# Patient Record
Sex: FEMALE | Race: WHITE | NOT HISPANIC OR LATINO | Employment: OTHER | URBAN - METROPOLITAN AREA
[De-identification: names, ages, dates, MRNs, and addresses within clinical notes are randomized per-mention and may not be internally consistent; named-entity substitution may affect disease eponyms.]

---

## 2018-04-09 LAB
ALBUMIN SERPL BCP-MCNC: 4.2 G/DL (ref 3.5–5.7)
ALP SERPL-CCNC: 62 IU/L (ref 55–165)
ALT SERPL W P-5'-P-CCNC: 12 IU/L (ref 9–28)
ANION GAP SERPL CALCULATED.3IONS-SCNC: 10.4 MM/L
ANISOCYTOSIS (HISTORICAL): ABNORMAL
AST SERPL W P-5'-P-CCNC: 24 U/L (ref 7–26)
BANDS (HISTORICAL): 4
BASOPHILS # BLD AUTO: 0.1 X3/UL (ref 0–0.3)
BASOPHILS # BLD AUTO: 1.1 % (ref 0–2)
BILIRUB SERPL-MCNC: 0.9 MG/DL (ref 0.3–1)
BUN SERPL-MCNC: 12 MG/DL (ref 7–25)
CALCIUM SERPL-MCNC: 9.3 MG/DL (ref 8.6–10.5)
CHLORIDE SERPL-SCNC: 104 MM/L (ref 98–107)
CO2 SERPL-SCNC: 29 MM/L (ref 21–31)
CREAT SERPL-MCNC: 0.66 MG/DL (ref 0.6–1.2)
DEPRECATED RDW RBC AUTO: 20.4 % (ref 11.5–14.5)
EGFR (HISTORICAL): > 60 GFR
EGFR AFRICAN AMERICAN (HISTORICAL): > 60 GFR
EOSINOPHIL # BLD AUTO: 0.1 X3/UL (ref 0–0.5)
EOSINOPHIL NFR BLD AUTO: 0.4 % (ref 0–5)
GLUCOSE (HISTORICAL): 139 MG/DL (ref 65–99)
HCT VFR BLD AUTO: 33.7 % (ref 37–47)
HGB BLD-MCNC: 10.4 G/DL (ref 12–16)
HYPOCHROMASIA (HISTORICAL): SLIGHT
LYMPHOCYTES # BLD AUTO: 1.1 X3/UL (ref 1.2–4.2)
LYMPHOCYTES NFR BLD AUTO: 8 % (ref 20.5–51.1)
LYMPHOCYTES NFR BLD AUTO: 8.2 % (ref 20.5–51.1)
MCH RBC QN AUTO: 21.7 PG (ref 26–34)
MCHC RBC AUTO-ENTMCNC: 31 G/DL (ref 31–36)
MCV RBC AUTO: 70.2 FL (ref 81–99)
MICROCYTOSIS (HISTORICAL): ABNORMAL
MONOCYTES # BLD AUTO: 0.7 X3/UL (ref 0–1)
MONOCYTES (HISTORICAL): 3 % (ref 1.7–12)
MONOCYTES NFR BLD AUTO: 4.8 % (ref 1.7–12)
NEUTROPHILS # BLD AUTO: 11.8 X3/UL (ref 1.4–6.5)
NEUTROPHILS ABS COUNT (HISTORICAL): 85 % (ref 42.2–75.2)
NEUTS SEG NFR BLD AUTO: 85.5 % (ref 42.2–75.2)
NRBC'S (HISTORICAL): 3 /100W
OSMOLALITY, SERUM (HISTORICAL): 281 MOSM (ref 262–291)
OVALOCYTOSIS (HISTORICAL): SLIGHT
PLATELET # BLD AUTO: 88 X3/UL (ref 130–400)
PLATELET ESTIMATE (HISTORICAL): ABNORMAL
PMV BLD AUTO: 9.4 FL (ref 8.6–11.7)
POTASSIUM SERPL-SCNC: 3.4 MM/L (ref 3.5–5.5)
RBC # BLD AUTO: 4.81 X6/UL (ref 3.9–5.2)
SODIUM SERPL-SCNC: 140 MM/L (ref 134–143)
STOMATOCYTOSIS (HISTORICAL): SLIGHT
TEAR DROP CELLS (HISTORICAL): SLIGHT
TOTAL PROTEIN (HISTORICAL): 6.6 G/DL (ref 6.4–8.9)
WBC # BLD AUTO: 13.9 X3/UL (ref 4.8–10.8)

## 2018-04-10 LAB
EST. AVERAGE GLUCOSE BLD GHB EST-MCNC: 146 MG/DL
HBA1C MFR BLD HPLC: 6.7 % (ref 4–6.2)

## 2018-05-07 ENCOUNTER — EVALUATION (OUTPATIENT)
Dept: PHYSICAL THERAPY | Facility: CLINIC | Age: 73
End: 2018-05-07
Payer: MEDICARE

## 2018-05-07 DIAGNOSIS — M46.1 SACROILIITIS, NOT ELSEWHERE CLASSIFIED (HCC): Primary | ICD-10-CM

## 2018-05-07 DIAGNOSIS — M54.31 RIGHT SIDED SCIATICA: ICD-10-CM

## 2018-05-07 PROCEDURE — 97162 PT EVAL MOD COMPLEX 30 MIN: CPT | Performed by: PHYSICAL THERAPIST

## 2018-05-07 PROCEDURE — G8991 OTHER PT/OT GOAL STATUS: HCPCS | Performed by: PHYSICAL THERAPIST

## 2018-05-07 PROCEDURE — G8990 OTHER PT/OT CURRENT STATUS: HCPCS | Performed by: PHYSICAL THERAPIST

## 2018-05-07 NOTE — LETTER
May 8, 2018    [facility] [address]    Patient: Ashok Delgado   YOB: 1945   Date of Visit: 2018     Encounter Diagnosis     ICD-10-CM    1  Sacroiliitis, not elsewhere classified ([facility] ) M46 1    2  Right sided sciatica M54 31        Dear Dr Basilio Generous:    Please review the attached Plan of Care from [facility] recent visit  Please verify that you agree therapy should continue by signing the attached document and sending it back to our office  If you have any questions or concerns, please don't hesitate to call  Sincerely,    Liam Sheehan PT      Referring Provider:      I certify that I have read the below Plan of Care and certify the need for these services furnished under this plan of treatment while under my care  Beto Sumner MD  [address] [id number]  VIA Facsimile: [phone]          PT Evaluation     Today's date: 2018  Patient name: Ashok Delgado  : 1945  MRN: [id number]  Referring provider: Jarret Peterson MD  Dx:   Encounter Diagnosis     ICD-10-CM    1  Sacroiliitis, not elsewhere classified ([facility] 75 ) M46 1    2  Right sided sciatica M54 31                   Assessment  Impairments: abnormal or restricted ROM, activity intolerance and pain with function  Functional limitations: sitting tolerance limited to 30 mins, standing tolerance limited to 30 mins, walking tolerance limited to <100 feet  Assessment details: Ashok Delgado is a 74 y o  female who presents with complaints of R sided low back and buttock pain with radiating pain into R LE  MD dx of Sacroiliitis, not elsewhere classified ([facility] 75 )  (primary encounter diagnosis) and Right sided sciatica  No further referral appears necessary at this time based upon examination results  Prognosis is good given HEP compliance and PT 2x/wk  Please contact me if you have any questions or recommendations    Thank you for the opportunity to share in  [place]'s care  Understanding of Dx/Px/POC: good   Prognosis: good    Goals  STGs  1  In 4 weeks, patient will demonstrate a decrease in pain to 2/10 during functional activities  2  In 4 weeks, patient will demonstrate an increase in pain free range of motion by 25% lumbar ROM lateral flexion and rotation  3  In 4 weeks, patient will demonstrate independence with HEP    LTGs  1  In 4-8 weeks, patient will be independent with ambulation for >500 feet, no AD  2  In 4-8 weeks, patient will be able to tolerate sitting for >1 hr without increased pain  3  In 4-8 weeks, patient will demonstrate lumbar spine ROM WNL all directions pain free      Plan  Patient would benefit from: skilled PT  Planned modality interventions: thermotherapy: hydrocollator packs and cryotherapy  Planned therapy interventions: joint mobilization, manual therapy, activity modification, abdominal trunk stabilization, balance, postural training, patient education, stretching, strengthening, therapeutic exercise, home exercise program and body mechanics training  Frequency: 2x week  Duration in weeks: 4  Treatment plan discussed with: patient        Subjective Evaluation    History of Present Illness  Date of onset: 2018  Mechanism of injury: Patient reports she "almost fell" at the of February at home when her knee gave out on her  Her right ankle "turned in" and she was able to grab onto freezer next to her to avoid a fall to the ground     Not a recurrent problem   Pain  Current pain rating: 3  At best pain ratin  At worst pain ratin  Location: begins in center of lower back, radiates into R side/ R buttock radiates along lateral/posterior aspect of thigh and lower leg into lateral foot  Quality: burning and dull ache (burning in hip/buttock; ache in the heel)  Relieving factors: rest, heat and medications (takes Acetopminiphen prn, has relief within 30-45 mins)  Aggravating factors: walking and sitting  Progression: improved (noticed improvement after completing sterroid medication pack within last 2 weeks)    Social Support  Steps to enter house: yes  Stairs in house: yes   Lives in: multiple-level home  Lives with: spouse    Employment status: not working  Exercise history: cleaning, yardwork, planting/gardening      Diagnostic Tests  X-ray: normal  Treatments  Current treatment: medication  Patient Goals  Patient goals for therapy: decreased pain  Patient goal: to be able to walk without the cane and to lose the fear of falling        Objective     Special Questions  Positive for history of cancer   Negative for night pain, bladder dysfunction, bowel dysfunction and saddle (S4) numbness    Neurological Testing     Sensation     Lumbar   Left   Intact: light touch    Right   Diminished: light touch    Comments   Right light touch: diminished along L2,L3 and S1 dermatomes    Reflexes   Left   Patellar (L4): brisk (3+)  Achilles (S1): normal (2+)  Babinski sign: negative  Clonus sign: negative    Right   Patellar (L4): brisk (3+)  Achilles (S1): normal (2+)  Babinski sign: negative  Clonus sign: negative    Active Range of Motion     Additional Active Range of Motion Details  Flexion in standing = 25% limited, mild relief from pain  Repeated flexion in standing = reduces pain, improved flexibility with reps  Extension in standing = produces pain in center of lumbar spine  Repeated extension in standing = produces consistent pain at center of lumbar spine    Flexion in sitting = does not affect pain  Repeated flexion in sitting = does not affect pain    R lateral flexion in standing = 50-75% limited ROM, reproduces pain on R side lumbar spine  L lateral flexion in standing = 50-75% limited ROM, reproduces a painful pull on R side lumbar spine  R rotation in standing = 75% limited ROM, produces pain on R side lumbar spine  L rotation in standing = 75% limited ROM, produces mild pain on L side lumbar spine    Flexion in lying = increases R side lumbar spine pain  Repeated flexion in lying = increases R side lumbar spine pain  Extension in lying = NT (patient states she could not lay prone ever since having children, pain is chronic)  Repeated extension in lying = NT    Rotation in lying:  Knees to R = "stiff"  Knees to L = "pulling pain"    Strength/Myotome Testing     Lumbar   Left   Normal strength    Right   Normal strength    Tests       Thoracic   Negative slump  Lumbar   Negative slump  Left   Positive crossed SLR and passive SLR  Right   Positive passive SLR       Additional Tests Details  Passive SLR pain begins at <50* of hip flexion  HS 90-90: -30* b/l              Daily Treatment Diary   Precautions: polycythemia vera (rare blood CA), osteoporosis  Manual              Piriformis stretch b/l                                                                     Exercise Diary              NuStep             Piriformis stretch             HS stretch             Gastroc stretch             Sciatic nerve glides - R only (supine knee ext with ankle PF)             Posterior pelvic tilting             Standing lumbar extension             LTR with knees to R                                                                                                                                                                             Modalities              CP to lumbar spine/R buttock post tx

## 2018-05-07 NOTE — PROGRESS NOTES
PT Evaluation     Today's date: 2018  Patient name: Farooq Mckay  : 1945  MRN: [id number]  Referring provider: Quan Weinberg MD  Dx:   Encounter Diagnosis     ICD-10-CM    1  Sacroiliitis, not elsewhere classified ([facility] 75 ) M46 1    2  Right sided sciatica M54 31                   Assessment  Impairments: abnormal or restricted ROM, activity intolerance and pain with function  Functional limitations: sitting tolerance limited to 30 mins, standing tolerance limited to 30 mins, walking tolerance limited to <100 feet  Assessment details: Farooq Mckay is a 74 y o  female who presents with complaints of R sided low back and buttock pain with radiating pain into R LE  MD dx of Sacroiliitis, not elsewhere classified ([facility] 75 )  (primary encounter diagnosis) and Right sided sciatica  No further referral appears necessary at this time based upon examination results  Prognosis is good given HEP compliance and PT 2x/wk  Please contact me if you have any questions or recommendations  Thank you for the opportunity to share in  [place]'s care  Understanding of Dx/Px/POC: good   Prognosis: good    Goals  STGs  1  In 4 weeks, patient will demonstrate a decrease in pain to 2/10 during functional activities  2  In 4 weeks, patient will demonstrate an increase in pain free range of motion by 25% lumbar ROM lateral flexion and rotation  3  In 4 weeks, patient will demonstrate independence with HEP    LTGs  1  In 4-8 weeks, patient will be independent with ambulation for >500 feet, no AD  2  In 4-8 weeks, patient will be able to tolerate sitting for >1 hr without increased pain    3  In 4-8 weeks, patient will demonstrate lumbar spine ROM WNL all directions pain free      Plan  Patient would benefit from: skilled PT  Planned modality interventions: thermotherapy: hydrocollator packs and cryotherapy  Planned therapy interventions: joint mobilization, manual therapy, activity modification, abdominal trunk stabilization, balance, postural training, patient education, stretching, strengthening, therapeutic exercise, home exercise program and body mechanics training  Frequency: 2x week  Duration in weeks: 4  Treatment plan discussed with: patient        Subjective Evaluation    History of Present Illness  Date of onset: 2018  Mechanism of injury: Patient reports she "almost fell" at the of February at home when her knee gave out on her  Her right ankle "turned in" and she was able to grab onto freezer next to her to avoid a fall to the ground  Not a recurrent problem   Pain  Current pain rating: 3  At best pain ratin  At worst pain ratin  Location: begins in center of lower back, radiates into R side/ R buttock radiates along lateral/posterior aspect of thigh and lower leg into lateral foot  Quality: burning and dull ache (burning in hip/buttock; ache in the heel)  Relieving factors: rest, heat and medications (takes Acetopminiphen prn, has relief within 30-45 mins)  Aggravating factors: walking and sitting  Progression: improved (noticed improvement after completing sterroid medication pack within last 2 weeks)    Social Support  Steps to enter house: yes  Stairs in house: yes   Lives in: multiple-level home  Lives with: spouse    Employment status: not working  Exercise history: cleaning, yardwork, planting/gardening      Diagnostic Tests  X-ray: normal  Treatments  Current treatment: medication  Patient Goals  Patient goals for therapy: decreased pain  Patient goal: to be able to walk without the cane and to lose the fear of falling        Objective     Special Questions  Positive for history of cancer   Negative for night pain, bladder dysfunction, bowel dysfunction and saddle (S4) numbness    Neurological Testing     Sensation     Lumbar   Left   Intact: light touch    Right   Diminished: light touch    Comments   Right light touch: diminished along L2,L3 and S1 dermatomes    Reflexes   Left   Patellar (L4): brisk (3+)  Achilles (S1): normal (2+)  Babinski sign: negative  Clonus sign: negative    Right   Patellar (L4): brisk (3+)  Achilles (S1): normal (2+)  Babinski sign: negative  Clonus sign: negative    Active Range of Motion     Additional Active Range of Motion Details  Flexion in standing = 25% limited, mild relief from pain  Repeated flexion in standing = reduces pain, improved flexibility with reps  Extension in standing = produces pain in center of lumbar spine  Repeated extension in standing = produces consistent pain at center of lumbar spine    Flexion in sitting = does not affect pain  Repeated flexion in sitting = does not affect pain    R lateral flexion in standing = 50-75% limited ROM, reproduces pain on R side lumbar spine  L lateral flexion in standing = 50-75% limited ROM, reproduces a painful pull on R side lumbar spine  R rotation in standing = 75% limited ROM, produces pain on R side lumbar spine  L rotation in standing = 75% limited ROM, produces mild pain on L side lumbar spine    Flexion in lying = increases R side lumbar spine pain  Repeated flexion in lying = increases R side lumbar spine pain  Extension in lying = NT (patient states she could not lay prone ever since having children, pain is chronic)  Repeated extension in lying = NT    Rotation in lying:  Knees to R = "stiff"  Knees to L = "pulling pain"    Strength/Myotome Testing     Lumbar   Left   Normal strength    Right   Normal strength    Tests       Thoracic   Negative slump  Lumbar   Negative slump  Left   Positive crossed SLR and passive SLR  Right   Positive passive SLR       Additional Tests Details  Passive SLR pain begins at <50* of hip flexion  HS 90-90: -30* b/l              Daily Treatment Diary   Precautions: polycythemia vera (rare blood CA), osteoporosis  Manual              Piriformis stretch b/l                                                                     Exercise Diary              NuStep Piriformis stretch             HS stretch             Gastroc stretch             Sciatic nerve glides - R only (supine knee ext with ankle PF)             Posterior pelvic tilting             Standing lumbar extension             LTR with knees to R                                                                                                                                                                             Modalities              CP to lumbar spine/R buttock post tx

## 2018-05-08 ENCOUNTER — TRANSCRIBE ORDERS (OUTPATIENT)
Dept: PHYSICAL THERAPY | Facility: CLINIC | Age: 73
End: 2018-05-08

## 2018-05-08 DIAGNOSIS — M46.1 SACROILIITIS, NOT ELSEWHERE CLASSIFIED (HCC): Primary | ICD-10-CM

## 2018-05-08 DIAGNOSIS — M54.31 BILATERAL SCIATICA: ICD-10-CM

## 2018-05-08 DIAGNOSIS — M54.32 BILATERAL SCIATICA: ICD-10-CM

## 2018-05-11 ENCOUNTER — OFFICE VISIT (OUTPATIENT)
Dept: PHYSICAL THERAPY | Facility: CLINIC | Age: 73
End: 2018-05-11
Payer: MEDICARE

## 2018-05-11 DIAGNOSIS — M54.31 RIGHT SIDED SCIATICA: ICD-10-CM

## 2018-05-11 DIAGNOSIS — M46.1 SACROILIITIS, NOT ELSEWHERE CLASSIFIED (HCC): Primary | ICD-10-CM

## 2018-05-11 PROCEDURE — 97140 MANUAL THERAPY 1/> REGIONS: CPT | Performed by: PHYSICAL THERAPIST

## 2018-05-11 PROCEDURE — 97110 THERAPEUTIC EXERCISES: CPT | Performed by: PHYSICAL THERAPIST

## 2018-05-11 NOTE — PROGRESS NOTES
Daily Note     Today's date: 2018  Patient name: Mandi Bah  : 1945  MRN: [id number]  Referring provider: Curtis Joya MD  Dx:   Encounter Diagnosis     ICD-10-CM    1  Sacroiliitis, not elsewhere classified ([facility] ) M46 1    2  Right sided sciatica M54 31                   Subjective: "I did some gardening yesterday       Objective: See treatment diary below  Daily Treatment Diary   Precautions: polycythemia vera (rare blood CA), osteoporosis  Manual              Piriformis stretch b/l 15'                                                                    Exercise Diary              NuStep L1, 10'            Piriformis stretch 10"x10 ea            HS stretch 90-90 with towel, 10"x10 ea            Gastroc stretch w/strap, 10"x10            Sciatic nerve glides - R only (supine knee ext with ankle PF)             Posterior pelvic tilting             Standing lumbar extension             LTR with knees to R                                                                                                                                                                             Modalities              CP to lumbar spine/R buttock post tx                                           Assessment: Tolerated treatment well  Patient slow to move through exercises  Patient exhibited good technique with therapeutic exercises and would benefit from continued PT Provided with HEP to begin piriformis and HS stretching at home  Plan: Continue per plan of care  Progress treatment as tolerated

## 2018-05-15 ENCOUNTER — OFFICE VISIT (OUTPATIENT)
Dept: PHYSICAL THERAPY | Facility: CLINIC | Age: 73
End: 2018-05-15
Payer: MEDICARE

## 2018-05-15 DIAGNOSIS — M46.1 SACROILIITIS, NOT ELSEWHERE CLASSIFIED (HCC): Primary | ICD-10-CM

## 2018-05-15 DIAGNOSIS — M54.31 RIGHT SIDED SCIATICA: ICD-10-CM

## 2018-05-15 PROCEDURE — 97110 THERAPEUTIC EXERCISES: CPT | Performed by: PHYSICAL THERAPIST

## 2018-05-15 PROCEDURE — 97140 MANUAL THERAPY 1/> REGIONS: CPT | Performed by: PHYSICAL THERAPIST

## 2018-05-15 NOTE — PROGRESS NOTES
Daily Note     Today's date: 5/15/2018  Patient name: Lizbet Chacon  : 1945  MRN: [id number]  Referring provider: Vanessa Craig MD  Dx:   Encounter Diagnosis     ICD-10-CM    1  Sacroiliitis, not elsewhere classified ([facility] ) M46 1    2  Right sided sciatica M54 31                   Subjective: Patient states she still has pain down her right leg  "It was all the way into my foot this morning but I took a Tylenol and now it's only into my R buttock " Patient states she has been doing some of her home exercises but not all of them  Objective: See treatment diary below  Daily Treatment Diary   Precautions: polycythemia vera (rare blood CA), osteoporosis  Manual  5/11 5/15           Piriformis stretch b/l 15' 15'                                                                   Exercise Diary  5/11 5/15           NuStep L1, 10' L3, 10'           Piriformis stretch 10"x10 ea            HS stretch 90-90 with towel, 10"x10 ea            Gastroc stretch w/strap, 10"x10            Sciatic nerve glides - R only (supine knee ext with ankle PF)  10x           Posterior pelvic tilting  5"x10           Standing lumbar extension  against // bar for balance/safety; 3"x10           LTR with knees to R  10"x10           Bridges   modified ROM, 10x                                                                   Modalities  5/11 5/15           CP to lumbar spine/R buttock post tx deferred deferred                                           Assessment: Tolerated treatment well  Patient reported reduced R LE pain at end of treatment  Patient exhibited good technique with therapeutic exercises and would benefit from continued PT      Plan: Continue per plan of care  Progress treatment as tolerated

## 2018-05-17 ENCOUNTER — OFFICE VISIT (OUTPATIENT)
Dept: PHYSICAL THERAPY | Facility: CLINIC | Age: 73
End: 2018-05-17
Payer: MEDICARE

## 2018-05-17 DIAGNOSIS — M46.1 SACROILIITIS, NOT ELSEWHERE CLASSIFIED (HCC): Primary | ICD-10-CM

## 2018-05-17 DIAGNOSIS — M54.31 RIGHT SIDED SCIATICA: ICD-10-CM

## 2018-05-17 PROCEDURE — 97110 THERAPEUTIC EXERCISES: CPT | Performed by: PHYSICAL THERAPIST

## 2018-05-17 PROCEDURE — 97140 MANUAL THERAPY 1/> REGIONS: CPT | Performed by: PHYSICAL THERAPIST

## 2018-05-17 NOTE — PROGRESS NOTES
Daily Note     Today's date: 2018  Patient name: Mihai Singer  : 1945  MRN: [id number]  Referring provider: Taryn Hussein MD  Dx:   Encounter Diagnosis     ICD-10-CM    1  [facility]ac[facility]ili[facility], not elsewhere [facility] ) M46 1    2  Right sided sciatica M54 31                   Subjective: "I'm doing really good  I got so much done yesterday, helped move some furniture, cleaned my windows and lamps  It's amazing what I can do compared to just a few weeks ago "      Objective: See treatment diary below  Daily Treatment Diary   Precautions: polycythemia vera (rare blood CA), osteoporosis  Manual  5/11 5/15 5/17          Piriformis stretch b/l 15' 15' 15'                                                                  Exercise Diary  5/11 5/15 5/17          NuStep L1, 10' L3, 10' L3, 10'          Piriformis stretch 10"x10 ea            HS stretch 90-90 with towel, 10"x10 ea            Gastroc stretch w/strap, 10"x10  standing, against edge of step, 10"x10 ea          Sciatic nerve glides - R only (supine knee ext with ankle PF)  10x 10x          Posterior pelvic tilting  5"x10 5"x20          Standing lumbar extension  against // bar for balance/safety; 3"x10 against // bar for balance/safety; 3"x10          LTR with knees to R  10"x10 10"x10          Bridges   modified ROM, 10x Mod ROM, 10x                                                                  Modalities  5/11 5/15 5/17          CP to lumbar spine/R buttock post tx deferred deferred deferred                                          Assessment: Tolerated treatment well  Progressed reps with pelvic tilts and added standing gastroc stretching  Patient would benefit from continued PT      Plan: Continue per plan of care  Progress treatment as tolerated

## 2018-05-22 ENCOUNTER — OFFICE VISIT (OUTPATIENT)
Dept: PHYSICAL THERAPY | Facility: CLINIC | Age: 73
End: 2018-05-22
Payer: MEDICARE

## 2018-05-22 DIAGNOSIS — M54.31 RIGHT SIDED SCIATICA: ICD-10-CM

## 2018-05-22 DIAGNOSIS — M46.1 SACROILIITIS, NOT ELSEWHERE CLASSIFIED (HCC): Primary | ICD-10-CM

## 2018-05-22 PROCEDURE — 97140 MANUAL THERAPY 1/> REGIONS: CPT | Performed by: PHYSICAL THERAPIST

## 2018-05-22 PROCEDURE — 97110 THERAPEUTIC EXERCISES: CPT | Performed by: PHYSICAL THERAPIST

## 2018-05-22 NOTE — PROGRESS NOTES
Daily Note     Today's date: 2018  Patient name: Mamadou De La Cruz  : 1945  MRN: [id number]  Referring provider: Marija Carlson MD  Dx:   Encounter Diagnosis     ICD-10-CM    1  Sacroili[facility], not elsewhere classified ([facility] ) M46 1    2  Right sided sciatica M54 31                   Subjective: Patient reports she has been very active around the home and has not needed her cane today  "I only carry it because I'm afraid "      Objective: See treatment diary below  Daily Treatment Diary   Precautions: polycythemia vera (rare blood CA), osteoporosis  Manual  5/11 5/15 5/17 5/22    Piriformis stretch b/l 15' 15' 15' 15'                                        Exercise Diary  5/11 5/15 5/17 5/22    NuStep L1, 10' L3, 10' L3, 10' L3,10'    Piriformis stretch 10"x10 ea       HS stretch 90-90 with towel, 10"x10 ea       Gastroc stretch w/strap, 10"x10  standing, against edge of step, 10"x10 ea     Sciatic nerve glides - R only (supine knee ext with ankle PF)  10x 10x 10x    Posterior pelvic tilting  5"x10 5"x20 5"x20    Standing lumbar extension  against // bar for balance/safety; 3"x10 against // bar for balance/safety; 3"x10     LTR  10"x10 knees to R 10"x10 knees to R 10"x10 b/l    Bridges   modified ROM, 10x Mod ROM, 10x Mod ROM, 15x    SLR flexion    10x ea    Step ups    "A" step 10x ea    Mini squats    10x                Modalities  5/11 5/15 5/17 5/22    CP to lumbar spine/R buttock post tx deferred deferred deferred                             Assessment: Tolerated treatment well  Glut max weakness persists as patient is unable to perform full ROM bridge exercise  Added mini squats and step ups today for standing glut and quad activation  Patient demonstrated fatigue post treatment, exhibited good technique with therapeutic exercises and would benefit from continued PT      Plan: Continue per plan of care  Progress treatment as tolerated

## 2018-05-24 ENCOUNTER — OFFICE VISIT (OUTPATIENT)
Dept: PHYSICAL THERAPY | Facility: CLINIC | Age: 73
End: 2018-05-24
Payer: MEDICARE

## 2018-05-24 DIAGNOSIS — M54.31 RIGHT SIDED SCIATICA: ICD-10-CM

## 2018-05-24 DIAGNOSIS — M46.1 SACROILIITIS, NOT ELSEWHERE CLASSIFIED (HCC): Primary | ICD-10-CM

## 2018-05-24 PROCEDURE — 97140 MANUAL THERAPY 1/> REGIONS: CPT

## 2018-05-24 PROCEDURE — 97110 THERAPEUTIC EXERCISES: CPT

## 2018-05-24 NOTE — PROGRESS NOTES
Daily Note     Today's date: 2018  Patient name: Ashley Parekh  : 1945  MRN: [id number]  Referring provider: Rebekah Cranker, MD  Dx:   Encounter Diagnosis     ICD-10-CM    1  Sacroiliitis, not elsewhere classified ([facility] ) M46 1    2  Right sided sciatica M54 31                   Subjective: Pt reports she is walking without her cane  Pt reports that her back is feeling better  Objective: See treatment diary below  Daily Treatment Diary   Precautions: polycythemia vera (rare blood CA), osteoporosis  Manual  5/11 5/15 5/17 5/22 5/24   Piriformis stretch b/l 15' 15' 15' 15' 15'                                       Exercise Diary  5/11 5/15 5/17 5/22 5/24   NuStep L1, 10' L3, 10' L3, 10' L3,10' L3 10'   Piriformis stretch 10"x10 ea       HS stretch 90-90 with towel, 10"x10 ea       Gastroc stretch w/strap, 10"x10  standing, against edge of step, 10"x10 ea  standing, against edge of step, 10"x10 ea   Sciatic nerve glides - R only (supine knee ext with ankle PF)  10x 10x 10x 10x   Posterior pelvic tilting  5"x10 5"x20 5"x20 5"x20   Standing lumbar extension  against // bar for balance/safety; 3"x10 against // bar for balance/safety; 3"x10     LTR  10"x10 knees to R 10"x10 knees to R 10"x10 b/l 10"x10 b/l   Bridges   modified ROM, 10x Mod ROM, 10x Mod ROM, 15x Mod ROM, 15x 5"   SLR flexion    10x ea 2x10 ea   Step ups    "A" step 10x ea "A" step 10x ea   Mini squats    10x 10x               Modalities  5/11 5/15 5/17 5/22 5/24   CP to lumbar spine/R buttock post tx deferred deferred deferred  NP                       Assessment: Tolerated treatment well  Increased reps on multiple exercises  Pt has limited PROM of B/L piriformis  Patient demonstrated fatigue post treatment and would benefit from continued PT      Plan: Continue per plan of care  Progress treatment as tolerated

## 2018-05-29 ENCOUNTER — OFFICE VISIT (OUTPATIENT)
Dept: PHYSICAL THERAPY | Facility: CLINIC | Age: 73
End: 2018-05-29
Payer: MEDICARE

## 2018-05-29 DIAGNOSIS — M46.1 SACROILIITIS, NOT ELSEWHERE CLASSIFIED (HCC): Primary | ICD-10-CM

## 2018-05-29 DIAGNOSIS — M54.31 RIGHT SIDED SCIATICA: ICD-10-CM

## 2018-05-29 PROCEDURE — 97110 THERAPEUTIC EXERCISES: CPT | Performed by: PHYSICAL THERAPIST

## 2018-05-29 PROCEDURE — 97140 MANUAL THERAPY 1/> REGIONS: CPT | Performed by: PHYSICAL THERAPIST

## 2018-05-29 NOTE — PROGRESS NOTES
Daily Note     Today's date: 2018  Patient name: Eber Jackson  : 1945  MRN: [id number]  Referring provider: Sarah Galvan MD  Dx:   Encounter Diagnosis     ICD-10-CM    1  Sacroiliitis, not elsewhere classified ([facility] [address] ) M46 1    2  Right sided sciatica M54 31                   Subjective: "I'm feeling really good  I even ran the vacuum in my downstairs without much difficulty  I get a little pinching afterward, but I can't believe how much better I'm feeling "      Objective: See treatment diary below  Precautions: polycythemia vera (rare blood CA), osteoporosis  Manual  5/29 5/15 5/17 5/22 5/24   Piriformis stretch b/l 15' 15' 15' 15' 15'                                       Exercise Diary  5/29 5/15 5/17 5/22 5/24   NuStep L3, 10' L3, 10' L3, 10' L3,10' L3 10'   Piriformis stretch HEP       HS stretch HEP       Gastroc stretch   standing, against edge of step, 10"x10 ea  standing, against edge of step, 10"x10 ea   Sciatic nerve glides - R only (supine knee ext with ankle PF)  10x 10x 10x 10x   Posterior pelvic tilting  5"x10 5"x20 5"x20 5"x20   Standing lumbar extension  against // bar for balance/safety; 3"x10 against // bar for balance/safety; 3"x10     LTR  10"x10 knees to R 10"x10 knees to R 10"x10 b/l 10"x10 b/l   Bridges  Modified ROM, 5" x15 modified ROM, 10x Mod ROM, 10x Mod ROM, 15x Mod ROM, 15x 5"   SLR flexion    10x ea 2x10 ea   Step ups "B" step 10x ea   "A" step 10x ea "A" step 10x ea   Mini squats 15x   10x 10x   TB shoulder ext Red, 5" 15x       TB rows Red, 5" 15x           Modalities  5/29 5/15 5/17 5/22 5/24   CP to lumbar spine/R buttock post tx deferred deferred deferred  NP                          Assessment: Tolerated treatment well  Patient ambulated through clinic without [facility], reports that she only carries it when outdoors "just to be safe " Patient challenged with mini squat exercise, cues to avoid genu valgus with this exercise  Added TB resistance with good tolerance  Patient demonstrated fatigue post treatment, exhibited good technique with therapeutic exercises and would benefit from continued PT      Plan: Continue per plan of care  Progress treatment as tolerated  Scheduled for re-evaluation with PT at next visit, possible DC to gym/HEP at that time

## 2018-05-31 ENCOUNTER — TRANSCRIBE ORDERS (OUTPATIENT)
Dept: PHYSICAL THERAPY | Facility: CLINIC | Age: 73
End: 2018-05-31

## 2018-05-31 ENCOUNTER — EVALUATION (OUTPATIENT)
Dept: PHYSICAL THERAPY | Facility: CLINIC | Age: 73
End: 2018-05-31
Payer: MEDICARE

## 2018-05-31 DIAGNOSIS — M46.1 SACROILIITIS, NOT ELSEWHERE CLASSIFIED (HCC): Primary | ICD-10-CM

## 2018-05-31 DIAGNOSIS — M54.31 RIGHT SIDED SCIATICA: ICD-10-CM

## 2018-05-31 DIAGNOSIS — M54.31 BILATERAL SCIATICA: ICD-10-CM

## 2018-05-31 DIAGNOSIS — M54.32 BILATERAL SCIATICA: ICD-10-CM

## 2018-05-31 PROCEDURE — 97110 THERAPEUTIC EXERCISES: CPT | Performed by: PHYSICAL THERAPIST

## 2018-05-31 PROCEDURE — G8991 OTHER PT/OT GOAL STATUS: HCPCS | Performed by: PHYSICAL THERAPIST

## 2018-05-31 PROCEDURE — G8990 OTHER PT/OT CURRENT STATUS: HCPCS | Performed by: PHYSICAL THERAPIST

## 2018-05-31 PROCEDURE — 97164 PT RE-EVAL EST PLAN CARE: CPT | Performed by: PHYSICAL THERAPIST

## 2018-05-31 PROCEDURE — 97140 MANUAL THERAPY 1/> REGIONS: CPT | Performed by: PHYSICAL THERAPIST

## 2018-05-31 NOTE — LETTER
May 31, 2018    Ailyn Cornejo [address] [facility]    Patient: Jocelin Robert   YOB: 1945   Date of Visit: 2018     Encounter Diagnosis     ICD-10-CM    1  Sacroiliitis, not elsewhere classified ([facility] [address] ) M46 1    2  Right sided sciatica M54 31        Dear Dr Dory Beasley:    Please review the attached Plan of Care from [facility] recent visit  Please verify that you agree therapy should continue by signing the attached document and sending it back to our office  If you have any questions or concerns, please don't hesitate to call  Sincerely,    Vane Londono PT      Referring Provider:      I certify that I have read the below Plan of Care and certify the need for these services furnished under this plan of treatment while under my care  Robb Fritz MD [facility] [address]: [phone]          PT Re-Evaluation     Today's date: 2018  Patient name: Jocelin Robert  : 1945  MRN: [id number]  Referring provider: Vashti Humphreys MD  Dx:   Encounter Diagnosis     ICD-10-CM    1  Sacroiliitis, not elsewhere classified ([facility] [address] ) M46 1    2  Right sided sciatica M54 31                   Assessment  Jocelin Robert has been compliant with PT services and has demonstrated decreased pain, increased strength, increased range of motion, and increased activity tolerance since starting physical therapy services  She reports an overall improvement of 99% since [facility] and is very pleased with her progress  Patient feels she has more energy and has been able to tolerate household chores and is now ambulating without her SPC inside of the home, uses [facility] for longer walks such as through parking lots or large stores  Patient is ready for DC from PT  Goals  STGs  1  In 4 weeks, patient will demonstrate a decrease in pain to 2/10 during functional activities- MET  2    In 4 weeks, patient will demonstrate an increase in pain free range of motion by 25% lumbar ROM lateral flexion and rotation - MET  3  In 4 weeks, patient will demonstrate independence with HEP - MET    LTGs  1  In 4-8 weeks, patient will be independent with ambulation for >500 feet, no AD - MET  2  In 4-8 weeks, patient will be able to tolerate sitting for >1 hr without increased pain  - MET  3  In 4-8 weeks, patient will demonstrate lumbar spine ROM WNL all directions pain free - partially met      Plan  DC from PT today  Patient provided with written HEP  Demonstrates independence with program  Patient plans to join a supervised wellness program at gym to continue strengthening  Subjective Evaluation    History of Present Illness  Date of onset: 2018  Mechanism of injury: Patient reports she "almost fell" at the of February at home when her knee gave out on her  Her right ankle "turned in" and she was able to grab onto freezer next to her to avoid a fall to the ground     Not a recurrent problem   Pain  Current pain rating: 3; RA = 0/10  At best pain ratin; RA = 0/10  At worst pain ratin; RA = 1/10  Location: begins in center of lower back, radiates into R side/ R buttock radiates along lateral/posterior aspect of thigh and lower leg into lateral foot  Quality: burning and dull ache (burning in hip/buttock; ache in the heel)  Relieving factors: rest, heat and medications (takes Acetopminiphen prn, has relief within 30-45 mins)  Aggravating factors: walking and sitting  Progression: improved (noticed improvement after completing sterroid medication pack within last 2 weeks)    Social Support  Steps to enter house: yes  Stairs in house: yes   Lives in: multiple-level home  Lives with: spouse    Employment status: not working  Exercise history: cleaning, yardwork, planting/gardening      Diagnostic Tests  X-ray: normal  Treatments  Current treatment: medication  Patient Goals  Patient goals for therapy: decreased pain  Patient goal: to be able to walk without the cane and to lose the fear of falling        Objective     Special Questions  Positive for history of cancer   Negative for night pain, bladder dysfunction, bowel dysfunction and saddle (S4) numbness    Neurological Testing     Sensation     Lumbar   Left   Intact: light touch    Right   Diminished: light touch    Comments   Right light touch: diminished along L2,L3 and S1 dermatomes    Reflexes   Left   Patellar (L4): brisk (3+)  Achilles (S1): normal (2+)  Babinski sign: negative  Clonus sign: negative    Right   Patellar (L4): brisk (3+)  Achilles (S1): normal (2+)  Babinski sign: negative  Clonus sign: negative    Active Range of Motion     Additional Active Range of Motion Details  Flexion in standing = 25% limited, mild relief from pain; RA = 10% limited, pain free  Repeated flexion in standing = reduces pain, improved flexibility with reps; RA = improved flexibility with reps, pain free  Extension in standing = produces pain in center of lumbar spine; RA = mild pain in lumbar spine  Repeated extension in standing = produces consistent pain at center of lumbar spine; RA = mild pain consistent in center of lumbar spine    Flexion in sitting = does not affect pain  Repeated flexion in sitting = does not affect pain    R lateral flexion in standing = 50-75% limited ROM, reproduces pain on R side lumbar spine;RA  = produces "ache" in lower back   L lateral flexion in standing = 50-75% limited ROM, reproduces a painful pull on R side lumbar spine; RA = produces "ache" in lower back  R rotation in standing = 75% limited ROM, produces pain on R side lumbar spine; RA = 75% limited with no pain  L rotation in standing = 75% limited ROM, produces mild pain on L side lumbar spine; RA = 75% limited with no pain    Flexion in lying = increases R side lumbar spine pain; RA = "very mild pain" in R side lumbar spine  Repeated flexion in lying = increases R side lumbar spine pain; RA = does not change intensity of pain  Extension in lying = NT (patient states she could not lay prone ever since having children, pain is chronic); RA NT  Repeated extension in lying = NT; RA NT    Rotation in lying:  Knees to R = "stiff"; RA = no sx  Knees to L = "pulling pain"; RA = "very mild pain"     Strength/Myotome Testing     Lumbar   Left   Normal strength    Right   Normal strength    Tests       Thoracic   Negative slump  Lumbar   Negative slump       Left   Positive crossed SLR and passive SLR  ; RA (-) passive SLR and (-) crossed SLR    Right   Positive passive SLR  ; RA = (-) passive SLR    Additional Tests Details  Passive SLR pain begins at <50* of hip flexion; RA = NA (-) passive SLR  HS 90-90: -30* b/l; RA = -25* b/l              Daily Treatment Diary   Precautions: polycythemia vera (rare blood CA), osteoporosis  Manual  5/29 5/31 5/17 5/22 5/24   Piriformis stretch b/l 15' 15' 15' 15' 15'                                       Exercise Diary  5/29 5/31 5/17 5/22 5/24   NuStep L3, 10' L3, 10' L3, 10' L3,10' L3 10'   Piriformis stretch HEP HEP      HS stretch HEP HEP      Gastroc stretch  HEP standing, against edge of step, 10"x10 ea  standing, against edge of step, 10"x10 ea   Sciatic nerve glides - R only (supine knee ext with ankle PF)  DC 10x 10x 10x   Posterior pelvic tilting  HEP 5"x20 5"x20 5"x20   Standing lumbar extension  HEP against // bar for balance/safety; 3"x10     LTR  10"x10 knees to R 10"x10 knees to R 10"x10 b/l 10"x10 b/l   Bridges  Modified ROM, 5" x15 modified ROM, 10x Mod ROM, 10x Mod ROM, 15x Mod ROM, 15x 5"   SLR flexion  2x10 ea  10x ea 2x10 ea   Step ups "B" step 10x ea   "A" step 10x ea "A" step 10x ea   Mini squats 15x 15x  10x 10x   TB shoulder ext Red, 5" 15x Red, 5" 15x      TB rows Red, 5" 15x Red, 5" 15x          Modalities  5/29 5/31 5/17 5/22 5/24   CP to lumbar spine/R buttock post tx deferred deferred deferred  NP

## 2018-05-31 NOTE — PROGRESS NOTES
PT Re-Evaluation     Today's date: 2018  Patient name: Eber Jackson  : 1945  MRN: [id number]  Referring provider: Sarah Galvan MD  Dx:   Encounter Diagnosis     ICD-10-CM    1  Sacroiliitis, not elsewhere classified ([facility] ) M46 1    2  Right sided sciatica M54 31                   Assessment  Eber Jackson has been compliant with PT services and has demonstrated decreased pain, increased strength, increased range of motion, and increased activity tolerance since starting physical therapy services  She reports an overall improvement of 99% since [facility] and is very pleased with her progress  Patient feels she has more energy and has been able to tolerate household chores and is now ambulating without her SPC inside of the home, uses [facility] for longer walks such as through parking lots or large stores  Patient is ready for DC from PT  Goals  STGs  1  In 4 weeks, patient will demonstrate a decrease in pain to 2/10 during functional activities- MET  2  In 4 weeks, patient will demonstrate an increase in pain free range of motion by 25% lumbar ROM lateral flexion and rotation - MET  3  In 4 weeks, patient will demonstrate independence with HEP - MET    LTGs  1  In 4-8 weeks, patient will be independent with ambulation for >500 feet, no AD - MET  2  In 4-8 weeks, patient will be able to tolerate sitting for >1 hr without increased pain  - MET  3  In 4-8 weeks, patient will demonstrate lumbar spine ROM WNL all directions pain free - partially met      Plan  DC from PT today  Patient provided with written HEP  Demonstrates independence with program  Patient plans to join a supervised wellness program at gym to continue strengthening  Subjective Evaluation    History of Present Illness  Date of onset: 2018  Mechanism of injury: Patient reports she "almost fell" at the of February at home when her knee gave out on her   Her right ankle "turned in" and she was able to grab onto freezer next to her to avoid a fall to the ground  Not a recurrent problem   Pain  Current pain rating: 3; RA = 0/10  At best pain ratin; RA = 0/10  At worst pain ratin; RA = 1/10  Location: begins in center of lower back, radiates into R side/ R buttock radiates along lateral/posterior aspect of thigh and lower leg into lateral foot  Quality: burning and dull ache (burning in hip/buttock; ache in the heel)  Relieving factors: rest, heat and medications (takes Acetopminiphen prn, has relief within 30-45 mins)  Aggravating factors: walking and sitting  Progression: improved (noticed improvement after completing sterroid medication pack within last 2 weeks)    Social Support  Steps to enter house: yes  Stairs in house: yes   Lives in: multiple-level home  Lives with: spouse    Employment status: not working  Exercise history: cleaning, yardwork, planting/gardening      Diagnostic Tests  X-ray: normal  Treatments  Current treatment: medication  Patient Goals  Patient goals for therapy: decreased pain  Patient goal: to be able to walk without the cane and to lose the fear of falling        Objective     Special Questions  Positive for history of cancer   Negative for night pain, bladder dysfunction, bowel dysfunction and saddle (S4) numbness    Neurological Testing     Sensation     Lumbar   Left   Intact: light touch    Right   Diminished: light touch    Comments   Right light touch: diminished along L2,L3 and S1 dermatomes    Reflexes   Left   Patellar (L4): brisk (3+)  Achilles (S1): normal (2+)  Babinski sign: negative  Clonus sign: negative    Right   Patellar (L4): brisk (3+)  Achilles (S1): normal (2+)  Babinski sign: negative  Clonus sign: negative    Active Range of Motion     Additional Active Range of Motion Details  Flexion in standing = 25% limited, mild relief from pain; RA = 10% limited, pain free  Repeated flexion in standing = reduces pain, improved flexibility with reps; RA = improved flexibility with reps, pain free  Extension in standing = produces pain in center of lumbar spine; RA = mild pain in lumbar spine  Repeated extension in standing = produces consistent pain at center of lumbar spine; RA = mild pain consistent in center of lumbar spine    Flexion in sitting = does not affect pain  Repeated flexion in sitting = does not affect pain    R lateral flexion in standing = 50-75% limited ROM, reproduces pain on R side lumbar spine;RA  = produces "ache" in lower back   L lateral flexion in standing = 50-75% limited ROM, reproduces a painful pull on R side lumbar spine; RA = produces "ache" in lower back  R rotation in standing = 75% limited ROM, produces pain on R side lumbar spine; RA = 75% limited with no pain  L rotation in standing = 75% limited ROM, produces mild pain on L side lumbar spine; RA = 75% limited with no pain    Flexion in lying = increases R side lumbar spine pain; RA = "very mild pain" in R side lumbar spine  Repeated flexion in lying = increases R side lumbar spine pain; RA = does not change intensity of pain  Extension in lying = NT (patient states she could not lay prone ever since having children, pain is chronic); RA NT  Repeated extension in lying = NT; RA NT    Rotation in lying:  Knees to R = "stiff"; RA = no sx  Knees to L = "pulling pain"; RA = "very mild pain"     Strength/Myotome Testing     Lumbar   Left   Normal strength    Right   Normal strength    Tests       Thoracic   Negative slump  Lumbar   Negative slump       Left   Positive crossed SLR and passive SLR  ; RA (-) passive SLR and (-) crossed SLR    Right   Positive passive SLR  ; RA = (-) passive SLR    Additional Tests Details  Passive SLR pain begins at <50* of hip flexion; RA = NA (-) passive SLR  HS 90-90: -30* b/l; RA = -25* b/l              Daily Treatment Diary   Precautions: polycythemia vera (rare blood CA), osteoporosis  Manual  5/29 5/31 5/17 5/22 5/24   Piriformis stretch b/l 15' 15' 15' 15' 15' Exercise Diary  5/29 5/31 5/17 5/22 5/24   NuStep L3, 10' L3, 10' L3, 10' L3,10' L3 10'   Piriformis stretch HEP HEP      HS stretch HEP HEP      Gastroc stretch  HEP standing, against edge of step, 10"x10 ea  standing, against edge of step, 10"x10 ea   Sciatic nerve glides - R only (supine knee ext with ankle PF)  DC 10x 10x 10x   Posterior pelvic tilting  HEP 5"x20 5"x20 5"x20   Standing lumbar extension  HEP against // bar for balance/safety; 3"x10     LTR  10"x10 knees to R 10"x10 knees to R 10"x10 b/l 10"x10 b/l   Bridges  Modified ROM, 5" x15 modified ROM, 10x Mod ROM, 10x Mod ROM, 15x Mod ROM, 15x 5"   SLR flexion  2x10 ea  10x ea 2x10 ea   Step ups "B" step 10x ea   "A" step 10x ea "A" step 10x ea   Mini squats 15x 15x  10x 10x   TB shoulder ext Red, 5" 15x Red, 5" 15x      TB rows Red, 5" 15x Red, 5" 15x          Modalities  5/29 5/31 5/17 5/22 5/24   CP to lumbar spine/R buttock post tx deferred deferred deferred  NP

## 2018-06-05 LAB
BANDS (HISTORICAL): 1
BASOPHILS # BLD AUTO: 0.1 X3/UL (ref 0–0.3)
BASOPHILS # BLD AUTO: 1.1 % (ref 0–2)
DEPRECATED RDW RBC AUTO: 26 % (ref 11.5–14.5)
EOSINOPHIL # BLD AUTO: 0 X3/UL (ref 0–0.5)
EOSINOPHIL NFR BLD AUTO: 0.3 % (ref 0–5)
HCT VFR BLD AUTO: 33.5 % (ref 37–47)
HGB BLD-MCNC: 10.7 G/DL (ref 12–16)
LYMPHOCYTES # BLD AUTO: 0.8 X3/UL (ref 1.2–4.2)
LYMPHOCYTES NFR BLD AUTO: 6.4 % (ref 20.5–51.1)
LYMPHOCYTES NFR BLD AUTO: 7 % (ref 20.5–51.1)
MACROCYTOSIS (HISTORICAL): SLIGHT
MCH RBC QN AUTO: 24 PG (ref 26–34)
MCHC RBC AUTO-ENTMCNC: 31.9 G/DL (ref 31–36)
MCV RBC AUTO: 75.1 FL (ref 81–99)
MICROCYTOSIS (HISTORICAL): ABNORMAL
MONOCYTES # BLD AUTO: 0.6 X3/UL (ref 0–1)
MONOCYTES (HISTORICAL): 4 % (ref 1.7–12)
MONOCYTES NFR BLD AUTO: 4.7 % (ref 1.7–12)
MYELOCYTES (HISTORICAL): 4 %
NEUTROPHILS # BLD AUTO: 11 X3/UL (ref 1.4–6.5)
NEUTROPHILS ABS COUNT (HISTORICAL): 84 % (ref 42.2–75.2)
NEUTS SEG NFR BLD AUTO: 87.5 % (ref 42.2–75.2)
OVALOCYTOSIS (HISTORICAL): ABNORMAL
PLATELET # BLD AUTO: 91 X3/UL (ref 130–400)
PLATELET ESTIMATE (HISTORICAL): ABNORMAL
PMV BLD AUTO: 9.2 FL (ref 8.6–11.7)
POIKILOCYTOSIS (HISTORICAL): ABNORMAL
RBC # BLD AUTO: 4.46 X6/UL (ref 3.9–5.2)
TEAR DROP CELLS (HISTORICAL): ABNORMAL
WBC # BLD AUTO: 12.6 X3/UL (ref 4.8–10.8)

## 2018-07-23 ENCOUNTER — TRANSCRIBE ORDERS (OUTPATIENT)
Dept: LAB | Facility: MEDICAL CENTER | Age: 73
End: 2018-07-23

## 2018-07-23 ENCOUNTER — APPOINTMENT (OUTPATIENT)
Dept: LAB | Facility: MEDICAL CENTER | Age: 73
End: 2018-07-23
Payer: MEDICARE

## 2018-07-23 DIAGNOSIS — D47.1 CHRONIC MYELOSIS (HCC): ICD-10-CM

## 2018-07-23 DIAGNOSIS — D47.1 CHRONIC MYELOSIS (HCC): Primary | ICD-10-CM

## 2018-07-23 LAB
ANISOCYTOSIS BLD QL SMEAR: PRESENT
BASOPHILS # BLD MANUAL: 0.26 THOUSAND/UL (ref 0–0.1)
BASOPHILS NFR MAR MANUAL: 2 % (ref 0–1)
DACRYOCYTES BLD QL SMEAR: PRESENT
EOSINOPHIL # BLD MANUAL: 0 THOUSAND/UL (ref 0–0.4)
EOSINOPHIL NFR BLD MANUAL: 0 % (ref 0–6)
ERYTHROCYTE [DISTWIDTH] IN BLOOD BY AUTOMATED COUNT: 21.3 % (ref 11.6–15.1)
HCT VFR BLD AUTO: 36 % (ref 34.8–46.1)
HGB BLD-MCNC: 10.2 G/DL (ref 11.5–15.4)
LYMPHOCYTES # BLD AUTO: 0.52 THOUSAND/UL (ref 0.6–4.47)
LYMPHOCYTES # BLD AUTO: 4 % (ref 14–44)
MCH RBC QN AUTO: 23.6 PG (ref 26.8–34.3)
MCHC RBC AUTO-ENTMCNC: 28.3 G/DL (ref 31.4–37.4)
MCV RBC AUTO: 83 FL (ref 82–98)
METAMYELOCYTES NFR BLD MANUAL: 4 % (ref 0–1)
MONOCYTES # BLD AUTO: 0.39 THOUSAND/UL (ref 0–1.22)
MONOCYTES NFR BLD: 3 % (ref 4–12)
MYELOCYTES NFR BLD MANUAL: 4 % (ref 0–1)
NEUTROPHILS # BLD MANUAL: 10.69 THOUSAND/UL (ref 1.85–7.62)
NEUTS BAND NFR BLD MANUAL: 4 % (ref 0–8)
NEUTS SEG NFR BLD AUTO: 79 % (ref 43–75)
NRBC BLD AUTO-RTO: 2 /100 WBCS
OVALOCYTES BLD QL SMEAR: PRESENT
PLATELET # BLD AUTO: 92 THOUSANDS/UL (ref 149–390)
PLATELET BLD QL SMEAR: ABNORMAL
POIKILOCYTOSIS BLD QL SMEAR: PRESENT
POLYCHROMASIA BLD QL SMEAR: PRESENT
RBC # BLD AUTO: 4.32 MILLION/UL (ref 3.81–5.12)
RBC MORPH BLD: PRESENT
WBC # BLD AUTO: 12.88 THOUSAND/UL (ref 4.31–10.16)

## 2018-07-23 PROCEDURE — 85027 COMPLETE CBC AUTOMATED: CPT

## 2018-07-23 PROCEDURE — 36415 COLL VENOUS BLD VENIPUNCTURE: CPT

## 2018-07-23 PROCEDURE — 85007 BL SMEAR W/DIFF WBC COUNT: CPT

## 2018-08-27 ENCOUNTER — TRANSCRIBE ORDERS (OUTPATIENT)
Dept: ADMINISTRATIVE | Facility: HOSPITAL | Age: 73
End: 2018-08-27

## 2018-08-27 DIAGNOSIS — R10.30 LOWER ABDOMINAL PAIN: Primary | ICD-10-CM

## 2018-08-27 DIAGNOSIS — R63.4 WEIGHT LOSS: ICD-10-CM

## 2018-09-10 ENCOUNTER — TRANSCRIBE ORDERS (OUTPATIENT)
Dept: LAB | Facility: MEDICAL CENTER | Age: 73
End: 2018-09-10

## 2018-09-10 ENCOUNTER — APPOINTMENT (OUTPATIENT)
Dept: LAB | Facility: MEDICAL CENTER | Age: 73
End: 2018-09-10
Payer: MEDICARE

## 2018-09-10 DIAGNOSIS — R63.4 WEIGHT LOSS: ICD-10-CM

## 2018-09-10 DIAGNOSIS — E13.8 DIABETES MELLITUS OF OTHER TYPE WITH COMPLICATION, UNSPECIFIED WHETHER LONG TERM INSULIN USE: ICD-10-CM

## 2018-09-10 DIAGNOSIS — E13.8 DIABETES MELLITUS OF OTHER TYPE WITH COMPLICATION, UNSPECIFIED WHETHER LONG TERM INSULIN USE: Primary | ICD-10-CM

## 2018-09-10 LAB
ALBUMIN SERPL BCP-MCNC: 3.5 G/DL (ref 3.5–5)
ALP SERPL-CCNC: 76 U/L (ref 46–116)
ALT SERPL W P-5'-P-CCNC: 19 U/L (ref 12–78)
ANION GAP SERPL CALCULATED.3IONS-SCNC: 5 MMOL/L (ref 4–13)
AST SERPL W P-5'-P-CCNC: 34 U/L (ref 5–45)
BILIRUB SERPL-MCNC: 1.03 MG/DL (ref 0.2–1)
BUN SERPL-MCNC: 13 MG/DL (ref 5–25)
CALCIUM SERPL-MCNC: 8.6 MG/DL (ref 8.3–10.1)
CHLORIDE SERPL-SCNC: 106 MMOL/L (ref 100–108)
CO2 SERPL-SCNC: 27 MMOL/L (ref 21–32)
CREAT SERPL-MCNC: 0.68 MG/DL (ref 0.6–1.3)
GFR SERPL CREATININE-BSD FRML MDRD: 86 ML/MIN/1.73SQ M
GLUCOSE SERPL-MCNC: 142 MG/DL (ref 65–140)
POTASSIUM SERPL-SCNC: 3.6 MMOL/L (ref 3.5–5.3)
PROT SERPL-MCNC: 6.8 G/DL (ref 6.4–8.2)
SODIUM SERPL-SCNC: 138 MMOL/L (ref 136–145)
T4 FREE SERPL-MCNC: 1.14 NG/DL (ref 0.76–1.46)
TSH SERPL DL<=0.05 MIU/L-ACNC: 1.97 UIU/ML (ref 0.36–3.74)

## 2018-09-10 PROCEDURE — 84439 ASSAY OF FREE THYROXINE: CPT

## 2018-09-10 PROCEDURE — 83036 HEMOGLOBIN GLYCOSYLATED A1C: CPT

## 2018-09-10 PROCEDURE — 36415 COLL VENOUS BLD VENIPUNCTURE: CPT

## 2018-09-10 PROCEDURE — 84443 ASSAY THYROID STIM HORMONE: CPT

## 2018-09-10 PROCEDURE — 80053 COMPREHEN METABOLIC PANEL: CPT

## 2018-09-11 LAB
EST. AVERAGE GLUCOSE BLD GHB EST-MCNC: 146 MG/DL
HBA1C MFR BLD: 6.7 % (ref 4.2–6.3)

## 2018-09-12 ENCOUNTER — APPOINTMENT (OUTPATIENT)
Dept: LAB | Facility: MEDICAL CENTER | Age: 73
End: 2018-09-12
Payer: MEDICARE

## 2018-09-12 LAB
CREAT UR-MCNC: 72 MG/DL
MICROALBUMIN UR-MCNC: 28 MG/L (ref 0–20)
MICROALBUMIN/CREAT 24H UR: 39 MG/G CREATININE (ref 0–30)

## 2018-09-12 PROCEDURE — 82570 ASSAY OF URINE CREATININE: CPT

## 2018-09-12 PROCEDURE — 82043 UR ALBUMIN QUANTITATIVE: CPT

## 2018-09-13 ENCOUNTER — HOSPITAL ENCOUNTER (OUTPATIENT)
Dept: CT IMAGING | Facility: HOSPITAL | Age: 73
Discharge: HOME | End: 2018-09-13
Attending: FAMILY MEDICINE
Payer: MEDICARE

## 2018-09-13 DIAGNOSIS — R63.4 WEIGHT LOSS: ICD-10-CM

## 2018-09-13 DIAGNOSIS — R10.30 LOWER ABDOMINAL PAIN: ICD-10-CM

## 2018-09-13 PROCEDURE — 74178 CT ABD&PLV WO CNTR FLWD CNTR: CPT

## 2018-09-13 RX ADMIN — IOHEXOL 80 ML: 350 INJECTION, SOLUTION INTRAVENOUS at 14:44

## 2018-11-01 ENCOUNTER — TRANSCRIBE ORDERS (OUTPATIENT)
Dept: LAB | Facility: CLINIC | Age: 73
End: 2018-11-01